# Patient Record
Sex: FEMALE | Race: WHITE | Employment: STUDENT | URBAN - METROPOLITAN AREA
[De-identification: names, ages, dates, MRNs, and addresses within clinical notes are randomized per-mention and may not be internally consistent; named-entity substitution may affect disease eponyms.]

---
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
## 2021-01-19 DIAGNOSIS — G90.A POTS (POSTURAL ORTHOSTATIC TACHYCARDIA SYNDROME): ICD-10-CM

## 2021-01-19 RX ORDER — METOPROLOL SUCCINATE 25 MG/1
25 TABLET, EXTENDED RELEASE ORAL DAILY
Qty: 90 TAB | Refills: 0 | Status: SHIPPED | OUTPATIENT
Start: 2021-01-19 | End: 2021-04-19

## 2021-04-03 ENCOUNTER — IMMUNIZATION (OUTPATIENT)
Dept: INTERNAL MEDICINE CLINIC | Age: 21
End: 2021-04-03
Payer: COMMERCIAL

## 2021-04-03 DIAGNOSIS — Z23 ENCOUNTER FOR IMMUNIZATION: Primary | ICD-10-CM

## 2021-04-03 PROCEDURE — 91300 COVID-19, MRNA, LNP-S, PF, 30MCG/0.3ML DOSE(PFIZER): CPT | Performed by: FAMILY MEDICINE

## 2021-04-03 PROCEDURE — 0001A COVID-19, MRNA, LNP-S, PF, 30MCG/0.3ML DOSE(PFIZER): CPT | Performed by: FAMILY MEDICINE

## 2021-04-14 DIAGNOSIS — G90.A POTS (POSTURAL ORTHOSTATIC TACHYCARDIA SYNDROME): ICD-10-CM

## 2021-04-19 RX ORDER — METOPROLOL SUCCINATE 25 MG/1
25 TABLET, EXTENDED RELEASE ORAL DAILY
Qty: 90 TAB | Refills: 0 | Status: SHIPPED | OUTPATIENT
Start: 2021-04-19 | End: 2021-07-12

## 2021-04-24 ENCOUNTER — IMMUNIZATION (OUTPATIENT)
Dept: INTERNAL MEDICINE CLINIC | Age: 21
End: 2021-04-24
Payer: COMMERCIAL

## 2021-04-24 DIAGNOSIS — Z23 ENCOUNTER FOR IMMUNIZATION: Primary | ICD-10-CM

## 2021-04-24 PROCEDURE — 91300 COVID-19, MRNA, LNP-S, PF, 30MCG/0.3ML DOSE(PFIZER): CPT

## 2021-04-24 PROCEDURE — 0002A COVID-19, MRNA, LNP-S, PF, 30MCG/0.3ML DOSE(PFIZER): CPT

## 2021-07-08 ENCOUNTER — OFFICE VISIT (OUTPATIENT)
Dept: PEDIATRIC GASTROENTEROLOGY | Age: 21
End: 2021-07-08
Payer: MEDICARE

## 2021-07-08 VITALS
TEMPERATURE: 97.8 F | BODY MASS INDEX: 15.59 KG/M2 | OXYGEN SATURATION: 98 % | HEIGHT: 66 IN | RESPIRATION RATE: 20 BRPM | HEART RATE: 76 BPM | DIASTOLIC BLOOD PRESSURE: 80 MMHG | WEIGHT: 97 LBS | SYSTOLIC BLOOD PRESSURE: 113 MMHG

## 2021-07-08 DIAGNOSIS — E44.0 MODERATE PROTEIN-CALORIE MALNUTRITION (HCC): ICD-10-CM

## 2021-07-08 DIAGNOSIS — R63.4 WEIGHT LOSS, UNINTENTIONAL: Primary | ICD-10-CM

## 2021-07-08 DIAGNOSIS — K31.84 GASTROPARESIS: ICD-10-CM

## 2021-07-08 PROCEDURE — G8510 SCR DEP NEG, NO PLAN REQD: HCPCS | Performed by: PEDIATRICS

## 2021-07-08 PROCEDURE — G8419 CALC BMI OUT NRM PARAM NOF/U: HCPCS | Performed by: PEDIATRICS

## 2021-07-08 PROCEDURE — 99214 OFFICE O/P EST MOD 30 MIN: CPT | Performed by: PEDIATRICS

## 2021-07-08 PROCEDURE — G8427 DOCREV CUR MEDS BY ELIG CLIN: HCPCS | Performed by: PEDIATRICS

## 2021-07-08 RX ORDER — ONDANSETRON 4 MG/1
4 TABLET, ORALLY DISINTEGRATING ORAL
Qty: 21 TABLET | Refills: 5 | Status: SHIPPED | OUTPATIENT
Start: 2021-07-08

## 2021-07-08 NOTE — PATIENT INSTRUCTIONS
Much less nausea and pain    Recommend daily senna - 1/2 tablet daily to keep stools     Labs today    Resume protein shakes twice per day    F/u with Adult GI in 6 months

## 2021-07-08 NOTE — LETTER
7/8/2021 3:50 PM    Ms. Juli Santos  [address]      Please allow Deatra Hamman to have access to food and snacks throughout the day.      Sincerely,      Prisca Jacome MD

## 2021-07-08 NOTE — PROGRESS NOTES
7/8/2021      Aby Rowe  2000    CC: Abdominal Pain    History of Present Illness  Jay Woody was seen today for routine follow up of her  abdominal pain and poor weight and eating problem. There have been no significant problems and the NG Tube was removed a few weeks ago. She is feeling a bit tired, and weight slipped to 97 lbs with reduced eating. She has no nausea, pain, vomiting. She is eating small volume frequent meals per mom. There are no reports of voiding problems. There are no reports of chronic fevers. There are no reports of rashes or joint pain. She is stooling on and off with larger dose senna Q 3-6 days. 12 point Review of Systems  No fever, + wt loss  No pain or vomiting  Otherwise negative    Past Medical History and Past Surgical History are unchanged since last visit. No Known Allergies    Current Outpatient Medications   Medication Sig Dispense Refill    ondansetron (ZOFRAN ODT) 4 mg disintegrating tablet Take 1 Tablet by mouth every eight (8) hours as needed for Nausea or Vomiting. 21 Tablet 5    metoprolol succinate (TOPROL-XL) 25 mg XL tablet Take 1 Tab by mouth daily. Please schedule follow up with Dr. Kaylah Noriega. 90 Tab 0    lansoprazole (PREVACID) 30 mg capsule Take  by mouth Daily (before breakfast).  norgestimate-ethinyl estradiol (SPRINTEC, 28,) 0.25-35 mg-mcg tab Take 1 Tab by mouth daily.       ziprasidone (GEODON) 20 mg capsule TAKE 1 CAPSULE BY MOUTH EVERY DAY AT BEDTIME  0    Desvenlafaxine 100 mg Tb24 TAKE 2 TABLETS EVERY DAY  0       Patient Active Problem List   Diagnosis Code    Epigastric pain R10.13    Nausea R11.0    Weight loss, unintentional R63.4    Gastroparesis K31.84    Moderate protein-calorie malnutrition (HCC) E44.0    Feeding by G-tube (HCC) Z93.1    POTS (postural orthostatic tachycardia syndrome) I49.8    Chronic constipation K59.09       Physical Exam  Vitals:    07/08/21 1509   BP: 113/80   Pulse: 76   Resp: 20   Temp: 97.8 °F (36.6 °C)   TempSrc: Oral   SpO2: 98%   Weight: 97 lb (44 kg)   Height: 5' 5.83\" (1.672 m)   PainSc:   0 - No pain   LMP: 07/01/2021      General: She is awake, alert, and in no distress, and appears to be a bit thin/hydrated  HEENT: The sclera appear anicteric, the conjunctiva pink, the oral mucosa appears without lesions, and the dentition is fair. Chest: Clear breath sounds  CV: Regular rate and rhythm   Abdomen: soft, no tenderness, no guarding non-distended, without masses. There is no hepatosplenomegaly, bowel sounds active  Extremities: well perfused with no joint abnormalities  Skin: no rash, no jaundice  Neuro: moves all 4 well  Lymph: no significant lymphadenopathy      Impression      Impression  Anastacia De Leon is 24 y.o. with failure to thrive and epigastric pain and nausea with gastroparesis and initial improvement with NG tube. She has 1 year without NG tube and weight has slipped from 103 to 97 today. BMI remains low - below 0.1%. Mom states Bárbara Serrato still looks good. \"     Plan/Recommendation  Much less nausea and pain    Recommend daily senna - 1/2 tablet daily to keep stools     Labs today: CBC, CMP, Vit D, iron profile    Resume protein shakes twice per day    Letter for work - OK to eat snacks Q 2 hours at work    F/u with Adult GI in 6 months           All patient and caregiver questions and concerns were addressed during the visit. Major risks, benefits, and side-effects of therapy were discussed.

## 2021-07-08 NOTE — LETTER
7/8/2021 5:56 PM    Ms. Obi Chan  [address] [address]      7/8/2021  Name: Obi Chan   MRN: [id number]   YOB: 2000   Date of Visit: 7/8/2021       Dear Dr. Jeramie Juárez MD,     I had the opportunity to see your patient, Obi Chan, age [address] y.o. in the Pediatric Gastroenterology office on 7/8/2021 for evaluation of her:  1. Weight loss, unintentional    2. Gastroparesis    3. Moderate protein-calorie malnutrition ([address]        Impression  Obi Chan is [address] y.o. with failure to thrive and epigastric pain and nausea with gastroparesis and initial improvement with NG tube. She has 1 year without NG tube and weight has slipped from 103 to 97 today. BMI remains low - below 0.1%. Mom states Gwen Morales still looks good. \"     Plan/Recommendation  Much less nausea and pain    Recommend daily senna - 1/2 tablet daily to keep stools     Labs today: CBC, CMP, Vit D, iron profile    Resume protein shakes twice per day    Letter for work - OK to eat snacks Q 2 hours at work    F/u with Adult GI in 6 months           Thank you very much for allowing me to participate in [facility]. Please do not hesitate to contact our office with any questions or concerns.            Sincerely,      Lizeth King MD

## 2021-07-11 DIAGNOSIS — G90.A POTS (POSTURAL ORTHOSTATIC TACHYCARDIA SYNDROME): ICD-10-CM

## 2021-07-12 RX ORDER — METOPROLOL SUCCINATE 25 MG/1
25 TABLET, EXTENDED RELEASE ORAL DAILY
Qty: 30 TABLET | Refills: 0 | Status: SHIPPED | OUTPATIENT
Start: 2021-07-12 | End: 2021-08-09

## 2021-07-13 LAB
25(OH)D3+25(OH)D2 SERPL-MCNC: 41.3 NG/ML (ref 30–100)
ALBUMIN SERPL-MCNC: 3.9 G/DL (ref 3.9–5)
ALBUMIN/GLOB SERPL: 1.3 {RATIO} (ref 1.2–2.2)
ALP SERPL-CCNC: 58 IU/L (ref 45–106)
ALT SERPL-CCNC: 9 IU/L (ref 0–32)
AST SERPL-CCNC: 14 IU/L (ref 0–40)
BASOPHILS # BLD AUTO: 0 X10E3/UL (ref 0–0.2)
BASOPHILS NFR BLD AUTO: 1 %
BILIRUB SERPL-MCNC: 0.2 MG/DL (ref 0–1.2)
BUN SERPL-MCNC: 9 MG/DL (ref 6–20)
BUN/CREAT SERPL: 12 (ref 9–23)
CALCIUM SERPL-MCNC: 9.6 MG/DL (ref 8.7–10.2)
CHLORIDE SERPL-SCNC: 101 MMOL/L (ref 96–106)
CO2 SERPL-SCNC: 26 MMOL/L (ref 20–29)
CREAT SERPL-MCNC: 0.74 MG/DL (ref 0.57–1)
ENDOMYSIUM IGA SER QL: NEGATIVE
EOSINOPHIL # BLD AUTO: 0.1 X10E3/UL (ref 0–0.4)
EOSINOPHIL NFR BLD AUTO: 1 %
ERYTHROCYTE [DISTWIDTH] IN BLOOD BY AUTOMATED COUNT: 12.3 % (ref 11.7–15.4)
GLIADIN PEPTIDE IGA SER-ACNC: 7 UNITS (ref 0–19)
GLIADIN PEPTIDE IGG SER-ACNC: 5 UNITS (ref 0–19)
GLOBULIN SER CALC-MCNC: 3.1 G/DL (ref 1.5–4.5)
GLUCOSE SERPL-MCNC: 79 MG/DL (ref 65–99)
HCT VFR BLD AUTO: 38.8 % (ref 34–46.6)
HGB BLD-MCNC: 13 G/DL (ref 11.1–15.9)
IGA SERPL-MCNC: 137 MG/DL (ref 87–352)
IMM GRANULOCYTES # BLD AUTO: 0 X10E3/UL (ref 0–0.1)
IMM GRANULOCYTES NFR BLD AUTO: 0 %
IRON SERPL-MCNC: 130 UG/DL (ref 27–159)
LIPASE SERPL-CCNC: 26 U/L (ref 14–72)
LYMPHOCYTES # BLD AUTO: 1.9 X10E3/UL (ref 0.7–3.1)
LYMPHOCYTES NFR BLD AUTO: 34 %
MCH RBC QN AUTO: 29.2 PG (ref 26.6–33)
MCHC RBC AUTO-ENTMCNC: 33.5 G/DL (ref 31.5–35.7)
MCV RBC AUTO: 87 FL (ref 79–97)
MONOCYTES # BLD AUTO: 0.4 X10E3/UL (ref 0.1–0.9)
MONOCYTES NFR BLD AUTO: 8 %
NEUTROPHILS # BLD AUTO: 3.1 X10E3/UL (ref 1.4–7)
NEUTROPHILS NFR BLD AUTO: 56 %
PLATELET # BLD AUTO: 215 X10E3/UL (ref 150–450)
POTASSIUM SERPL-SCNC: 4.3 MMOL/L (ref 3.5–5.2)
PROT SERPL-MCNC: 7 G/DL (ref 6–8.5)
RBC # BLD AUTO: 4.45 X10E6/UL (ref 3.77–5.28)
SODIUM SERPL-SCNC: 139 MMOL/L (ref 134–144)
TSH SERPL DL<=0.005 MIU/L-ACNC: 1.04 UIU/ML (ref 0.45–4.5)
TTG IGA SER-ACNC: <2 U/ML (ref 0–3)
TTG IGG SER-ACNC: <2 U/ML (ref 0–5)
WBC # BLD AUTO: 5.6 X10E3/UL (ref 3.4–10.8)

## 2021-08-07 DIAGNOSIS — G90.A POTS (POSTURAL ORTHOSTATIC TACHYCARDIA SYNDROME): ICD-10-CM

## 2021-08-09 RX ORDER — METOPROLOL SUCCINATE 25 MG/1
25 TABLET, EXTENDED RELEASE ORAL DAILY
Qty: 30 TABLET | Refills: 0 | Status: SHIPPED | OUTPATIENT
Start: 2021-08-09 | End: 2021-10-12 | Stop reason: SDUPTHER

## 2021-11-02 ENCOUNTER — TELEPHONE (OUTPATIENT)
Dept: CARDIOLOGY CLINIC | Age: 21
End: 2021-11-02

## 2021-11-02 NOTE — TELEPHONE ENCOUNTER
L/m on parent's C vm requesting a c/b re:11/11 appt. Please find out whether or not patient is coming into the office or is she supoose to be having a VV. If patient would like to have a VV then the appt needs to rsd.

## 2021-11-06 DIAGNOSIS — G90.A POTS (POSTURAL ORTHOSTATIC TACHYCARDIA SYNDROME): ICD-10-CM

## 2021-11-08 RX ORDER — METOPROLOL SUCCINATE 25 MG/1
25 TABLET, EXTENDED RELEASE ORAL DAILY
Qty: 90 TABLET | Refills: 0 | Status: SHIPPED | OUTPATIENT
Start: 2021-11-08 | End: 2022-01-24

## 2021-11-08 NOTE — TELEPHONE ENCOUNTER
Per VO by MD.     Future Appointments   Date Time Provider Ghislaine Jacquelyn   11/11/2021 10:00 AM MD LINO Gaxiola [facility]

## 2021-11-09 NOTE — TELEPHONE ENCOUNTER
Patient needs to be seen in the office (NO VV), she'll have to make an appt when she's home from college.

## 2022-01-23 DIAGNOSIS — G90.A POTS (POSTURAL ORTHOSTATIC TACHYCARDIA SYNDROME): ICD-10-CM

## 2022-01-24 ENCOUNTER — TELEPHONE (OUTPATIENT)
Dept: CARDIOLOGY CLINIC | Age: 22
End: 2022-01-24

## 2022-01-24 ENCOUNTER — DOCUMENTATION ONLY (OUTPATIENT)
Dept: CARDIOLOGY CLINIC | Age: 22
End: 2022-01-24

## 2022-01-24 RX ORDER — METOPROLOL SUCCINATE 25 MG/1
25 TABLET, EXTENDED RELEASE ORAL DAILY
Qty: 90 TABLET | Refills: 0 | Status: SHIPPED | OUTPATIENT
Start: 2022-01-24 | End: 2022-06-07 | Stop reason: SDUPTHER

## 2022-01-24 NOTE — TELEPHONE ENCOUNTER
Per VO by MD.    Future Appointments   Date Time Provider Ghislaine Jacquelyn   1/27/2022 10:20 AM MD LINO [place] [facility]

## 2022-03-18 PROBLEM — R63.4 WEIGHT LOSS, UNINTENTIONAL: Status: ACTIVE | Noted: 2019-05-01

## 2022-03-18 PROBLEM — Z93.1 FEEDING BY G-TUBE (HCC): Status: ACTIVE | Noted: 2019-05-16

## 2022-03-19 PROBLEM — G90.A POTS (POSTURAL ORTHOSTATIC TACHYCARDIA SYNDROME): Status: ACTIVE | Noted: 2019-08-01

## 2022-03-19 PROBLEM — K31.84 GASTROPARESIS: Status: ACTIVE | Noted: 2019-05-01

## 2022-03-19 PROBLEM — K59.09 CHRONIC CONSTIPATION: Status: ACTIVE | Noted: 2020-05-19

## 2022-03-19 PROBLEM — R10.13 EPIGASTRIC PAIN: Status: ACTIVE | Noted: 2019-03-28

## 2022-03-20 PROBLEM — R11.0 NAUSEA: Status: ACTIVE | Noted: 2019-03-28

## 2022-03-20 PROBLEM — E44.0 MODERATE PROTEIN-CALORIE MALNUTRITION (HCC): Status: ACTIVE | Noted: 2019-05-01

## 2022-06-06 DIAGNOSIS — G90.A POTS (POSTURAL ORTHOSTATIC TACHYCARDIA SYNDROME): ICD-10-CM

## 2022-06-07 DIAGNOSIS — G90.A POTS (POSTURAL ORTHOSTATIC TACHYCARDIA SYNDROME): ICD-10-CM

## 2022-06-07 RX ORDER — METOPROLOL SUCCINATE 25 MG/1
TABLET, EXTENDED RELEASE ORAL
Qty: 90 TABLET | Refills: 0 | OUTPATIENT
Start: 2022-06-07

## 2022-06-07 RX ORDER — METOPROLOL SUCCINATE 25 MG/1
25 TABLET, EXTENDED RELEASE ORAL DAILY
Qty: 90 TABLET | Refills: 0 | Status: SHIPPED | OUTPATIENT
Start: 2022-06-07 | End: 2022-06-09 | Stop reason: SDUPTHER

## 2022-06-07 NOTE — TELEPHONE ENCOUNTER
Patient requested a refill on metoprolol succinate 25mg      [facility] [phone]    Future Appointments   Date Time Provider Ghislaine Valladares   6/9/2022  1:20 PM Zeeshan Knott MD [facility]

## 2022-06-09 ENCOUNTER — OFFICE VISIT (OUTPATIENT)
Dept: CARDIOLOGY CLINIC | Age: 22
End: 2022-06-09
Payer: COMMERCIAL

## 2022-06-09 VITALS — WEIGHT: 98 LBS | HEIGHT: 65 IN | BODY MASS INDEX: 16.33 KG/M2 | OXYGEN SATURATION: 96 % | RESPIRATION RATE: 20 BRPM

## 2022-06-09 DIAGNOSIS — K31.84 GASTROPARESIS: ICD-10-CM

## 2022-06-09 DIAGNOSIS — G90.A POTS (POSTURAL ORTHOSTATIC TACHYCARDIA SYNDROME): Primary | ICD-10-CM

## 2022-06-09 PROCEDURE — 99214 OFFICE O/P EST MOD 30 MIN: CPT | Performed by: SPECIALIST

## 2022-06-09 PROCEDURE — 93000 ELECTROCARDIOGRAM COMPLETE: CPT | Performed by: SPECIALIST

## 2022-06-09 RX ORDER — VENLAFAXINE HYDROCHLORIDE 37.5 MG/1
CAPSULE, EXTENDED RELEASE ORAL
COMMUNITY
Start: 2022-06-06

## 2022-06-09 RX ORDER — VENLAFAXINE HYDROCHLORIDE 75 MG/1
75 CAPSULE, EXTENDED RELEASE ORAL DAILY
COMMUNITY
Start: 2022-05-26

## 2022-06-09 RX ORDER — METOPROLOL SUCCINATE 25 MG/1
25 TABLET, EXTENDED RELEASE ORAL DAILY
Qty: 90 TABLET | Refills: 3 | Status: SHIPPED | OUTPATIENT
Start: 2022-06-09

## 2022-06-09 NOTE — PROGRESS NOTES
Rosalio Garcia Soledad     2000       Zeeshan Freitas MD, [facility]  Date of Visit-6/9/2022   PCP is MD [facility]  [facility]  HPI:  Nadir Lopez is a 22 y.o. female   Last visit 4/20/20 for VV. 6 Krishna Adler is a very nice young lady who is followed by Georgie Crocker MD and Gaviota Sharp. She has a history of gastroparesis. She saw Dr. Justin Arce from pediatric cardiology 3 years ago. At that time her nutrition status was difficult and she was getting episodes of tachycardia. Orthostatics were done in the [address]  office and she was abnormal and so she was started on metoprolol. She said the metoprolol has helped. In 2020 she was getting increasing episodes again but her nutritional status was poor at that time; it is improved quite a bit and her tolerability. She still at at that time was getting fast heartbeats. Today. .. Pt is accompanied by her mother. Pt states that she gets some dizziness from transitioning from sitting to standing and to laying down. She notes she stands until she regains herself. She notes her HR changed from laying down to standing up. She reports an occasional CP that is 2/10, and that it lasts for about 2 minutes and arises without exertion. Denies chest pain, edema, syncope or shortness of breath at rest    EKG: SR WNL    Assessment/Plan:     Patient Instructions   You have been scheduled for an echocardiogram. We will call with results and see you back for an annual follow up. 1. POTS (postural orthostatic tachycardia syndrome)  Pt with autonomic dysfunction. Done well on metoprolol since about 2019. Notices clear benefit from reducing tachycardia  on standing. Does not recall prior tilt or echo. talked about in later life about managing BB with pregnancy. Not a concern right now. Will see her back in one year, refill meds and get echo.    - AMB POC EKG ROUTINE W/ 12 LEADS, INTER & REP    2. Gastroparesis  Discussed her nutritional status and comfortable and safe, she feels. F/u with Dr. Nico Tolliver. F/u in 1 year     Impression:   1. POTS (postural orthostatic tachycardia syndrome)    2. Gastroparesis       Cardiac History:   No specialty comments available. Future Appointments   Date Time Provider Ghislaine Valladares   7/12/2022  2:00 PM SAW BLAKELY [facility] AMB   6/12/2023  2:00 PM MD LINO [facility]        Patient Care Team:  Ricarda Vergara MD as PCP - General (Pediatric Medicine)  Ricarda Vergara MD as PCP - [facility]  Ricarda Vergara MD (Pediatric Medicine)  Shree Knox MD (Cardiovascular Disease Physician)    ROS-except as noted above. . A complete cardiac and respiratory are reviewed and negative except as above ; Resp-denies wheezing  or productive cough,. Const- No unusual weight loss or fever; Neuro-no recent seizure or CVA ; GI- No BRBPR, abdom pain, bloating ; - no  hematuria     Past Medical History:   Diagnosis Date    Acid reflux     Borderline personality disorder ([facility] [address] 2018    Borderline personality disorder ([facility] [address]     Chronic constipation 5/19/2020    Depression 2016      Social Hx= reports that she has never smoked. She has never used smokeless tobacco. She reports current alcohol use. She reports that she does not use drugs. Exam and Labs:  Resp 20   Ht 5' 5\" (1.651 m)   Wt 98 lb (44.5 kg)   SpO2 96%   BMI 16.31 kg/m² Constitutional:  NAD, comfortable  Head: NC,AT. Eyes: No scleral icterus. Neck:  Neck supple. No JVD present. Throat: moist mucous membranes. Chest: Effort normal & normal respiratory excursion . Neurological: alert, conversant and oriented . Skin: Skin is not cold. No obvious systemic rash noted. Not diaphoretic. No erythema. Psychiatric:  Grossly normal mood and affect. Behavior appears normal. Extremities:  no clubbing or cyanosis.  Abdomen: non distended    Lungs:breath sounds normal. No stridor. distress, wheezes or  Rales. Heart:with valsalva, there is no murmur normal rate, regular rhythm, normal S1, S2, no murmurs, rubs, clicks or gallops , PMI non displaced. Edema: Edema is none. No results found for: CHOL, CHOLX, CHLST, CHOLV, HDL, HDLP, LDL, LDLC, DLDLP, TGLX, TRIGL, TRIGP, CHHD, CHHDX  Lab Results   Component Value Date/Time    Sodium 139 07/09/2021 01:56 PM    Potassium 4.3 07/09/2021 01:56 PM    Chloride 101 07/09/2021 01:56 PM    CO2 26 07/09/2021 01:56 PM    Anion gap 7 10/05/2019 05:20 PM    Glucose 79 07/09/2021 01:56 PM    BUN 9 07/09/2021 01:56 PM    Creatinine 0.74 07/09/2021 01:56 PM    BUN/Creatinine ratio 12 07/09/2021 01:56 PM    GFR est  07/09/2021 01:56 PM    GFR est non- 07/09/2021 01:56 PM    Calcium 9.6 07/09/2021 01:56 PM      Wt Readings from Last 3 Encounters:   06/09/22 98 lb (44.5 kg)   07/08/21 97 lb (44 kg) (2 %, Z= -2.09)*   08/03/20 99 lb 9.6 oz (45.2 kg) (4 %, Z= -1.79)*     * Growth percentiles are based on CDC (Girls, 2-20 Years) data. BP Readings from Last 3 Encounters:   07/08/21 113/80   08/03/20 113/79   03/17/20 116/81      Current Outpatient Medications   Medication Sig    venlafaxine-SR (EFFEXOR-XR) 75 mg capsule Take 75 mg by mouth daily.  venlafaxine-SR (EFFEXOR-XR) 37.5 mg capsule     metoprolol succinate (TOPROL-XL) 25 mg XL tablet Take 1 Tablet by mouth daily.  ondansetron (ZOFRAN ODT) 4 mg disintegrating tablet Take 1 Tablet by mouth every eight (8) hours as needed for Nausea or Vomiting.  lansoprazole (PREVACID) 30 mg capsule Take  by mouth Daily (before breakfast).  norgestimate-ethinyl estradiol (SPRINTEC, 28,) 0.25-35 mg-mcg tab Take 1 Tablet by mouth daily. Patient uses different Birth Control, do not remember the name    ziprasidone (GEODON) 20 mg capsule TAKE 1 CAPSULE BY MOUTH EVERY DAY AT BEDTIME     No current facility-administered medications for this visit. Impression see above.       Written by Khanh Leal, as dictated by Katja Stevenson MD.

## 2022-06-09 NOTE — Clinical Note
6/9/2022    Patient: Corinne Simon   YOB: 2000   Date of Visit: 6/9/2022     Bess Mclean MD  [address] [facility] [id number]  Via Fax: [phone]    Dear Bess Mclean MD,      Thank you for referring Ms. Juan Francisoc Tamayo to [facility] for evaluation. My notes for this consultation are attached. If you have questions, please do not hesitate to call me. I look forward to following your patient along with you.       Sincerely,    Gisele Ortiz MD

## 2022-07-12 ENCOUNTER — ANCILLARY PROCEDURE (OUTPATIENT)
Dept: CARDIOLOGY CLINIC | Age: 22
End: 2022-07-12
Payer: COMMERCIAL

## 2022-07-12 VITALS
DIASTOLIC BLOOD PRESSURE: 80 MMHG | WEIGHT: 98 LBS | BODY MASS INDEX: 16.33 KG/M2 | SYSTOLIC BLOOD PRESSURE: 113 MMHG | HEIGHT: 65 IN

## 2022-07-12 DIAGNOSIS — G90.A POTS (POSTURAL ORTHOSTATIC TACHYCARDIA SYNDROME): ICD-10-CM

## 2022-07-12 PROCEDURE — 93306 TTE W/DOPPLER COMPLETE: CPT | Performed by: SPECIALIST

## 2022-07-20 LAB
ECHO AO ROOT DIAM: 2.8 CM
ECHO AO ROOT INDEX: 1.92 CM/M2
ECHO AV PEAK GRADIENT: 5 MMHG
ECHO AV PEAK VELOCITY: 1.1 M/S
ECHO AV VELOCITY RATIO: 0.82
ECHO EST RA PRESSURE: 3 MMHG
ECHO LA DIAMETER INDEX: 1.44 CM/M2
ECHO LA DIAMETER: 2.1 CM
ECHO LA TO AORTIC ROOT RATIO: 0.75
ECHO LA VOL 2C: 21 ML (ref 22–52)
ECHO LA VOL 4C: 13 ML (ref 22–52)
ECHO LA VOL BP: 17 ML (ref 22–52)
ECHO LA VOL/BSA BIPLANE: 12 ML/M2 (ref 16–34)
ECHO LA VOLUME AREA LENGTH: 20 ML
ECHO LA VOLUME INDEX A2C: 14 ML/M2 (ref 16–34)
ECHO LA VOLUME INDEX A4C: 9 ML/M2 (ref 16–34)
ECHO LA VOLUME INDEX AREA LENGTH: 14 ML/M2 (ref 16–34)
ECHO LV E' LATERAL VELOCITY: 23 CM/S
ECHO LV E' SEPTAL VELOCITY: 15 CM/S
ECHO LV FRACTIONAL SHORTENING: 41 % (ref 28–44)
ECHO LV INTERNAL DIMENSION DIASTOLE INDEX: 2.53 CM/M2
ECHO LV INTERNAL DIMENSION DIASTOLIC: 3.7 CM (ref 3.9–5.3)
ECHO LV INTERNAL DIMENSION SYSTOLIC INDEX: 1.51 CM/M2
ECHO LV INTERNAL DIMENSION SYSTOLIC: 2.2 CM
ECHO LV ISOVOLUMETRIC RELAXATION TIME (IVRT): 64.7 MS
ECHO LV IVSD: 0.8 CM (ref 0.6–0.9)
ECHO LV MASS 2D: 89.5 G (ref 67–162)
ECHO LV MASS INDEX 2D: 61.3 G/M2 (ref 43–95)
ECHO LV POSTERIOR WALL DIASTOLIC: 0.9 CM (ref 0.6–0.9)
ECHO LV RELATIVE WALL THICKNESS RATIO: 0.49
ECHO LVOT PEAK GRADIENT: 3 MMHG
ECHO LVOT PEAK VELOCITY: 0.9 M/S
ECHO MV "A" WAVE DURATION: 161.8 MSEC
ECHO MV A VELOCITY: 0.43 M/S
ECHO MV E DECELERATION TIME (DT): 177.2 MS
ECHO MV E VELOCITY: 0.73 M/S
ECHO MV E/A RATIO: 1.7
ECHO MV E/E' LATERAL: 3.17
ECHO MV E/E' RATIO (AVERAGED): 4.02
ECHO MV E/E' SEPTAL: 4.87
ECHO RIGHT VENTRICULAR SYSTOLIC PRESSURE (RVSP): 18 MMHG
ECHO RV FREE WALL PEAK S': 12 CM/S
ECHO RV TAPSE: 1.8 CM (ref 1.7–?)
ECHO TV REGURGITANT MAX VELOCITY: 1.95 M/S
ECHO TV REGURGITANT PEAK GRADIENT: 15 MMHG

## 2023-09-06 DIAGNOSIS — I49.8 OTHER SPECIFIED CARDIAC ARRHYTHMIAS: ICD-10-CM

## 2023-09-06 RX ORDER — METOPROLOL SUCCINATE 25 MG/1
TABLET, EXTENDED RELEASE ORAL
Qty: 90 TABLET | Refills: 0 | Status: SHIPPED | OUTPATIENT
Start: 2023-09-06

## 2024-06-28 NOTE — PATIENT INSTRUCTIONS
Call nursing about replacing NG tube in office in mid July - to arrange with nurse visit Pt notified.

## (undated) DEVICE — CONTAINER SPEC 20 ML LID NEUT BUFF FORMALIN 10 % POLYPR STS

## (undated) DEVICE — ENDO CARRY-ON PROCEDURE KIT INCLUDES ENZYMATIC SPONGE, GAUZE, BIOHAZARD LABEL, TRAY, LUBRICANT, DIRTY SCOPE LABEL, WATER LABEL, TRAY, DRAWSTRING PAD, AND DEFENDO 4-PIECE KIT.: Brand: ENDO CARRY-ON PROCEDURE KIT

## (undated) DEVICE — CANN NASAL O2 CAPNOGRAPHY AD -- FILTERLINE

## (undated) DEVICE — BAG BELONG PT PERS CLEAR HANDL

## (undated) DEVICE — NEEDLE HYPO 18GA L1.5IN PNK S STL HUB POLYPR SHLD REG BVL

## (undated) DEVICE — BW-412T DISP COMBO CLEANING BRUSH: Brand: SINGLE USE COMBINATION CLEANING BRUSH

## (undated) DEVICE — 1200 GUARD II KIT W/5MM TUBE W/O VAC TUBE: Brand: GUARDIAN

## (undated) DEVICE — KENDALL RADIOLUCENT FOAM MONITORING ELECTRODE -RECTANGULAR SHAPE: Brand: KENDALL

## (undated) DEVICE — CATH IV AUTOGRD BC BLU 22GA 25 -- INSYTE

## (undated) DEVICE — SYRINGE MED 20ML STD CLR PLAS LUERLOCK TIP N CTRL DISP

## (undated) DEVICE — FORCEPS BX L240CM JAW DIA2.8MM L CAP W/ NDL MIC MESH TOOTH

## (undated) DEVICE — Device: Brand: MEDICAL ACTION INDUSTRIES

## (undated) DEVICE — Z DISCONTINUED NO SUB IDED SET EXTN W/ 4 W STPCOCK M SPIN LOK 36IN

## (undated) DEVICE — SET ADMIN 16ML TBNG L100IN 2 Y INJ SITE IV PIGGY BK DISP

## (undated) DEVICE — SOLIDIFIER FLUID 3000 CC ABSORB

## (undated) DEVICE — BAG SPEC BIOHZD LF 2MIL 6X10IN -- CONVERT TO ITEM 357326